# Patient Record
(demographics unavailable — no encounter records)

---

## 2024-10-31 NOTE — REASON FOR VISIT
[Patient] : patient [Mother] : mother [Initial Evaluation] : an initial evaluation [FreeTextEntry1] : left knee injury, fell on knee again on monday

## 2024-10-31 NOTE — ASSESSMENT
[FreeTextEntry1] : Verito is a 17Y female with left patellar tendinitis and NOF proximal tibia Today's visit included obtaining history from the parent due to the child's age, the child could not be considered a reliable historian, requiring parent to act as independent historian  Clinical findings and imaging discussed at length with mother and patient. The natural history of above condition was discussed. Recommendation at this time would be NSAIDs, rest, ice, activity modification. We also recommended OTC patella tendon brace to decrease tension on the apophysitis and quadriceps stretching. In regard to NOF, we will continue to monitor. She will f/u in 1 year for repeat clinical evaluation and XR left knee. Also, for her soft tissue defect of the right shoulder, I recommend massaging her scar with Vitamin E and bio oil and to f/u with dermatology. All questions answered. Family and patient verbalize understanding of the plan.   ILiza PA-C have acted as scribe and documented the above for Dr. Wheat

## 2024-10-31 NOTE — PHYSICAL EXAM
[FreeTextEntry1] : Gait: Presents ambulating independently without signs of antalgia.  Good coordination and balance noted. GENERAL: alert, cooperative, in NAD SKIN: The skin is intact, warm, pink and dry over the area examined. EYES: Normal conjunctiva, normal eyelids and pupils were equal and round. ENT: normal ears, normal nose and normal lips. CARDIOVASCULAR: brisk capillary refill, but no peripheral edema. RESPIRATORY: The patient is in no apparent respiratory distress. They're taking full deep breaths without use of accessory muscles or evidence of audible wheezes or stridor without the use of a stethoscope. Normal respiratory effort. ABDOMEN: not examined  Focused exam left knee No bony deformities, signs of trauma, or erythema noted. No visible effusion, muscle atrophy or asymmetry. No tenderness in bony prominences or soft tissue.  No joint line, MCL, LCL, or quadriceps tendon tenderness.  There is tenderness about the patella tendon  Full active and passive range of motion of the knee. Toes are warm, pink, and moving freely.   Sensation is intact to light touch distally. Patellar reflex +2 B/L. Brisk capillary refill in all toes. No joint laxity palpable. Joint is stable with varus and valgus stress. Negative Lachman test, negative anterior and posterior drawer with solid end point.  Negative Olu test. Negative patellar grind and patellar apprehension test.  No abnormal findings on ankle or hip examination.

## 2024-10-31 NOTE — HISTORY OF PRESENT ILLNESS
[FreeTextEntry1] : Verito is a 17Y female who presents with her mother for evaluation of new left knee injury sustained 10/28/24. She was playing football and fell landing directly on the left knee cap. The pain is localized to the anterior aspect of the patella. Patient states that the swelling has resolved. Denies any radiating pain, numbness or any tingling sensation. Denies any need for pain medication at home. Here for orthopedic evaluation and management.   Of note, she was seen in the past for soft tissue defect right upper arm. MRI LUE was reviewed with mother and  patient and she was referred to dermatology. Mother states that she underwent biopsy, and it was inconclusive. She is feeling much better now.

## 2024-10-31 NOTE — DATA REVIEWED
[de-identified] : XR left knee 4 views performed today 10/31/24: There is NOF proximal tibia measuring 37 x 14 mm and is healing. No acute fracture or dislocation

## 2024-10-31 NOTE — END OF VISIT
[FreeTextEntry3] :     Saw and examined patient; the above is an accurate documentation of my words and actions.   Vivien Wheat MD [facility] Pediatric Orthopedic Surgery    [Time Spent: ___ minutes] : I have spent [unfilled] minutes of time on the encounter which excludes teaching and separately reported services.

## 2024-12-05 NOTE — PHYSICAL EXAM
[FreeTextEntry1] : Gait: Presents ambulating independently with limp secondary to pain  GENERAL: alert, cooperative, in NAD SKIN: The skin is intact, warm, pink and dry over the area examined. EYES: Normal conjunctiva, normal eyelids and pupils were equal and round. ENT: normal ears, normal nose and normal lips. CARDIOVASCULAR: brisk capillary refill, but no peripheral edema. RESPIRATORY: The patient is in no apparent respiratory distress. They're taking full deep breaths without use of accessory muscles or evidence of audible wheezes or stridor without the use of a stethoscope. Normal respiratory effort. ABDOMEN: not examined  Focused exam right ankle Skin is intact and there is no breakdown or abrasion Soft tissue swelling lateral aspect of the ankle Mild ecchymosis lateral aspect Limited ROM of the ankle due to pain There is tenderness about the ATFL Brisk capillary refill distally NV intact

## 2024-12-05 NOTE — HISTORY OF PRESENT ILLNESS
[FreeTextEntry1] : Verito is a 17Y female who presents with her mother for evaluation of new right ankle injury sustained 12/4/24. She was playing football at school and when she jumped, she landed awkwardly on her right ankle. She immediately noted pain and inability to bear weight on her right ankle. She applied ice with some improvement in swelling. Denies any need for pain medication at home. Denies any radiating pain, numbness or any tingling sensation. Here for an orthopedic evaluation and management.

## 2024-12-05 NOTE — REVIEW OF SYSTEMS
[Change in Activity] : change in activity [Limping] : limping [Joint Pains] : arthralgias [Joint Swelling] : joint swelling  [Nl] : Musculoskeletal

## 2024-12-05 NOTE — ASSESSMENT
[FreeTextEntry1] : Verito is a 17Y female with right ATFL ankle sprain sustained 12/4/24 Today's visit included obtaining history from the parent due to the child's age, the child could not be considered a reliable historian, requiring parent to act as independent historian  Clinical findings and imaging discussed at length with mother and patient. The natural history of above discussed. Recommendation at this time would be CAM boot for immobilization. However, patient declined as she would prefer to walk in regular sneaker. I think it is a reasonable option. She was provided with physical therapy prescription to work on pain relief and ankle strengthening. Avoid gym, sports and recess. Rest, ice, elevation, and NSAIDs as needed for pain relief. She will f/u in 4 weeks for repeat clinical evaluation. All questions answered. Family and patient verbalize understanding of the plan.   ILiza PA-C have acted as scribe and documented the above for Dr. Wheat

## 2024-12-05 NOTE — DATA REVIEWED
[de-identified] : XR right ankle 3 views performed today 12/25/24: no acute fracture. Ankle mortise intact

## 2024-12-05 NOTE — REASON FOR VISIT
[Initial Evaluation] : an initial evaluation [Mother] : mother [Patient] : patient [FreeTextEntry1] : right ankle injury. DOI-12/04/24

## 2024-12-05 NOTE — DATA REVIEWED
[de-identified] : XR right ankle 3 views performed today 12/25/24: no acute fracture. Ankle mortise intact

## 2025-01-06 NOTE — HISTORY OF PRESENT ILLNESS
[FreeTextEntry1] : Verito is a 17Y female who presents with her mother for follow up of right ankle injury sustained 12/4/24. She was playing football at school and when she jumped, she landed awkwardly on her right ankle. She immediately noted pain and inability to bear weight on her right ankle. She applied ice with some improvement in swelling. At initial visit, we recommended CAM boot however parents preferred to walk in regular sneaker. She was provided with prescription for PT.  She returns today doing well. Denies any current ankle pain. She is undergoing PT 2x/week for past 1 month.  Denies any need for pain medication at home. Denies any radiating pain, numbness or any tingling sensation. Here for an orthopedic evaluation and management.   The patient's HPI was reviewed thoroughly with patient and parent. The patient's parent has acted as an independent historian regarding the above information due to the unreliable nature of the history obtained from the patient.

## 2025-01-06 NOTE — ASSESSMENT
[FreeTextEntry1] : Verito is a 17Y female with right ATFL ankle sprain sustained 12/4/24 Today's visit included obtaining history from the parent due to the child's age, the child could not be considered a reliable historian, requiring parent to act as independent historian  Clinical findings discussed at length with mother and patient. The natural history of above discussed. At this time, she is doing well with full range of motion of the ankle with minimal swelling. Recommendation at this time is to complete the course of physical therapy. She will gradually return to activities starting 1/27/25. School note provided. She will f/u on prn basis. All questions answered. Family and patient verbalize understanding of the plan.   ILiza PA-C have acted as scribe and documented the above for Dr. Wheat

## 2025-01-06 NOTE — REASON FOR VISIT
[Follow Up] : a follow up visit [Mother] : mother [Patient] : patient [FreeTextEntry1] : right ankle injury

## 2025-01-06 NOTE — PHYSICAL EXAM
[FreeTextEntry1] : Gait: Presents ambulating independently without limp GENERAL: alert, cooperative, in NAD SKIN: The skin is intact, warm, pink and dry over the area examined. EYES: Normal conjunctiva, normal eyelids and pupils were equal and round. ENT: normal ears, normal nose and normal lips. CARDIOVASCULAR: brisk capillary refill, but no peripheral edema. RESPIRATORY: The patient is in no apparent respiratory distress. They're taking full deep breaths without use of accessory muscles or evidence of audible wheezes or stridor without the use of a stethoscope. Normal respiratory effort. ABDOMEN: not examined  Focused exam right ankle Skin is intact and there is no breakdown or abrasion Soft tissue swelling lateral aspect of the ankle resolving  No ecchymosis lateral aspect Full ankle range of motion without any pain  There is no tenderness about the ATFL Brisk capillary refill distally NV intact

## 2025-01-06 NOTE — REVIEW OF SYSTEMS
[Nl] : Musculoskeletal [Change in Activity] : change in activity [Limping] : no limping [Joint Pains] : no arthralgias [Joint Swelling] : no joint swelling

## 2025-01-06 NOTE — END OF VISIT
[FreeTextEntry3] :     Saw and examined patient; the above is an accurate documentation of my words and actions.   Vivien Wheat MD [facility] Pediatric Orthopedic Surgery